# Patient Record
Sex: FEMALE | Race: OTHER | HISPANIC OR LATINO | Employment: UNEMPLOYED | URBAN - METROPOLITAN AREA
[De-identification: names, ages, dates, MRNs, and addresses within clinical notes are randomized per-mention and may not be internally consistent; named-entity substitution may affect disease eponyms.]

---

## 2022-04-20 ENCOUNTER — HOSPITAL ENCOUNTER (EMERGENCY)
Facility: HOSPITAL | Age: 57
Discharge: HOME | End: 2022-04-20
Attending: EMERGENCY MEDICINE | Admitting: EMERGENCY MEDICINE
Payer: COMMERCIAL

## 2022-04-20 VITALS
WEIGHT: 196.21 LBS | HEART RATE: 95 BPM | DIASTOLIC BLOOD PRESSURE: 83 MMHG | SYSTOLIC BLOOD PRESSURE: 152 MMHG | TEMPERATURE: 99.7 F | RESPIRATION RATE: 16 BRPM | OXYGEN SATURATION: 100 %

## 2022-04-20 DIAGNOSIS — J02.0 STREP PHARYNGITIS: Primary | ICD-10-CM

## 2022-04-20 PROCEDURE — 99284 EMERGENCY DEPT VISIT MOD MDM: CPT | Performed by: EMERGENCY MEDICINE

## 2022-04-20 PROCEDURE — 99282 EMERGENCY DEPT VISIT SF MDM: CPT

## 2022-04-20 RX ORDER — PENICILLIN V POTASSIUM 250 MG/1
500 TABLET ORAL ONCE
Status: COMPLETED | OUTPATIENT
Start: 2022-04-20 | End: 2022-04-20

## 2022-04-20 RX ORDER — PENICILLIN V POTASSIUM 500 MG/1
500 TABLET ORAL 2 TIMES DAILY
Qty: 40 TABLET | Refills: 0 | Status: SHIPPED | OUTPATIENT
Start: 2022-04-20 | End: 2022-04-27

## 2022-04-20 RX ADMIN — DEXAMETHASONE SODIUM PHOSPHATE 10 MG: 10 INJECTION, SOLUTION INTRAMUSCULAR; INTRAVENOUS at 11:33

## 2022-04-20 RX ADMIN — PENICILLIN V POTASSIUM 500 MG: 250 TABLET, FILM COATED ORAL at 11:33

## 2022-04-20 NOTE — ED PROVIDER NOTES
History  Chief Complaint   Patient presents with    Sore Throat     sore throat and fever beginning yesterday  Denies cough or congestion     57y F here for evaluation of fever, ha, sore throat  Started yesterday and worsening  C/o generalized sore throat, worse w/ swallowing  Reports fever/chills, diffuse ha  Denies runny nose, congestion or cough  C/o some vague abd discomfort with no n/v/d  No rashes  Friend w/ similar symptoms a week or so ago but they were better after on a day to two  Denies other sig medical problems, no other co      History provided by:  Patient   used: Yes (Projectioneering)    Sore Throat  Location:  Generalized  Quality:  Sore and sharp  Severity:  Severe  Onset quality:  Gradual  Timing:  Constant  Progression:  Worsening  Chronicity:  New  Relieved by:  Nothing  Worsened by:  Eating and drinking  Ineffective treatments:  None tried  Associated symptoms: abdominal pain, chills, fever and headaches    Associated symptoms: no adenopathy, no chest pain, no cough, no ear pain, no epistaxis, no neck stiffness, no night sweats, no postnasal drip, no rash, no rhinorrhea, no shortness of breath, no sinus congestion and no voice change    Risk factors: sick contacts        None       History reviewed  No pertinent past medical history  Past Surgical History:   Procedure Laterality Date     SECTION         History reviewed  No pertinent family history  I have reviewed and agree with the history as documented  E-Cigarette/Vaping     E-Cigarette/Vaping Substances     Social History     Tobacco Use    Smoking status: Never Smoker    Smokeless tobacco: Never Used   Substance Use Topics    Alcohol use: Never    Drug use: Never       Review of Systems   Constitutional: Positive for chills and fever  Negative for night sweats  HENT: Positive for sore throat  Negative for ear pain, nosebleeds, postnasal drip, rhinorrhea and voice change      Respiratory: Negative for cough and shortness of breath  Cardiovascular: Negative for chest pain  Gastrointestinal: Positive for abdominal pain  Musculoskeletal: Negative for neck stiffness  Skin: Negative for rash  Neurological: Positive for headaches  Hematological: Negative for adenopathy  All other systems reviewed and are negative  Physical Exam  Physical Exam  Vitals and nursing note reviewed  Constitutional:       Appearance: Normal appearance  HENT:      Nose: Nose normal       Mouth/Throat:      Palate: No lesions  Pharynx: Uvula midline  Oropharyngeal exudate and posterior oropharyngeal erythema present  Tonsils: Tonsillar exudate and tonsillar abscess present  Eyes:      Conjunctiva/sclera: Conjunctivae normal    Cardiovascular:      Rate and Rhythm: Normal rate and regular rhythm  Heart sounds: No murmur heard  Pulmonary:      Effort: Pulmonary effort is normal       Breath sounds: Normal breath sounds  Abdominal:      Palpations: Abdomen is soft  Tenderness: There is no abdominal tenderness  Musculoskeletal:         General: No deformity  Cervical back: Full passive range of motion without pain and neck supple  No rigidity  Normal range of motion  Lymphadenopathy:      Cervical: Cervical adenopathy present  Skin:     General: Skin is warm  Findings: No rash  Neurological:      General: No focal deficit present  Mental Status: She is alert           Vital Signs  ED Triage Vitals [04/20/22 0957]   Temperature Pulse Respirations Blood Pressure SpO2   99 7 °F (37 6 °C) 95 16 152/83 100 %      Temp Source Heart Rate Source Patient Position - Orthostatic VS BP Location FiO2 (%)   Oral Monitor Sitting Right arm --      Pain Score       --           Vitals:    04/20/22 0957   BP: 152/83   Pulse: 95   Patient Position - Orthostatic VS: Sitting         Visual Acuity      ED Medications  Medications   dexamethasone oral liquid 10 mg 1 mL (10 mg Oral Given 4/20/22 1133)   penicillin V potassium (VEETID) tablet 500 mg (500 mg Oral Given 4/20/22 1133)       Diagnostic Studies  Results Reviewed     None                 No orders to display              Procedures  Procedures         ED Course                                             MDM  Number of Diagnoses or Management Options  Strep pharyngitis: new and does not require workup  Diagnosis management comments: Exudative pharyngitis/tonsillitis w/ cervical/submandibular LAD, +fever and no cough - will tx empirically for strep    Patient Progress  Patient progress: improved      Disposition  Final diagnoses:   Strep pharyngitis     Time reflects when diagnosis was documented in both MDM as applicable and the Disposition within this note     Time User Action Codes Description Comment    4/20/2022 11:01 AM Sherie HARRISON Add [J02 0] Strep pharyngitis       ED Disposition     ED Disposition Condition Date/Time Comment    Discharge Stable Wed Apr 20, 2022 [address] [facility] [address] discharge to home/self care  Follow-up Information    None         Discharge Medication List as of 4/20/2022 11:08 AM      START taking these medications    Details   penicillin V potassium (VEETID) 500 mg tablet Take 1 tablet (500 mg total) by mouth 2 (two) times a day for 7 days, Starting Wed 4/20/2022, Until Wed 4/27/2022, Normal             No discharge procedures on file      PDMP Review     None          ED Provider  Electronically Signed by           Vito Forrester DO  04/20/22 9066

## 2022-04-20 NOTE — DISCHARGE INSTRUCTIONS
Return for worsening pain, unable to swallow, continued fever after 2 days of antibiotics, or for any concerns      Regrese por empeoramiento del dolor, incapacidad para tragar, fiebre continua después de 2 días de antibióticos o por cualquier inquietud